# Patient Record
Sex: FEMALE | Race: WHITE
[De-identification: names, ages, dates, MRNs, and addresses within clinical notes are randomized per-mention and may not be internally consistent; named-entity substitution may affect disease eponyms.]

---

## 2019-08-28 ENCOUNTER — HOSPITAL ENCOUNTER (OUTPATIENT)
Dept: HOSPITAL 74 - JRADIR | Age: 84
Discharge: HOME | End: 2019-08-28
Attending: INTERNAL MEDICINE
Payer: COMMERCIAL

## 2019-08-28 DIAGNOSIS — D34: Primary | ICD-10-CM

## 2019-08-28 PROCEDURE — 0GBG3ZX EXCISION OF LEFT THYROID GLAND LOBE, PERCUTANEOUS APPROACH, DIAGNOSTIC: ICD-10-PCS | Performed by: RADIOLOGY

## 2019-08-28 PROCEDURE — BG44ZZZ ULTRASONOGRAPHY OF THYROID GLAND: ICD-10-PCS | Performed by: RADIOLOGY

## 2019-09-03 NOTE — PATH
Cytology Non-Gynecological Report



Patient Name:  TWIN HYATT

Accession #:  

[facility]. Rec. #:  [id number]                                                        

   /Age/Gender:  1935 (Age: 84) / F

Account:  [id number]                                                          

             Location: RADIOLOGY INTER

Taken:  2019

Received:  2019

Reported:  9/3/2019

Physicians:  MEGHANA Zapata M.D.

  



Specimen(s) Received

 LEFT THYROID FNA 





Clinical History

Left thyroid nodule







Final Diagnosis

THYROID, LEFT, FINE NEEDLE ASPIRATION:

SATISFACTORY FOR EVALUATION

BETHESDA CLASS II: BENIGN 

SMALL FOLLICULAR CELLS AND COLLOID PRESENT, CONSISTENT WITH A BENIGN FOLLICULAR

NODULE. 







***Electronically Signed***

Rubi Cabello M.D.





Gross Description

Received are eight direct smears, four of which are air-dried and Diff-Quik

stained, and four of which are alcohol fixed and Pap stained.  Also received is

20 ml of bloody formalin from which one cellblock is prepared.

## 2019-09-04 ENCOUNTER — HOSPITAL ENCOUNTER (OUTPATIENT)
Dept: HOSPITAL 74 - JRADIR | Age: 84
Discharge: HOME | End: 2019-09-04
Attending: INTERNAL MEDICINE
Payer: COMMERCIAL

## 2019-09-04 DIAGNOSIS — E04.1: Primary | ICD-10-CM

## 2019-09-04 PROCEDURE — 0G9K3ZX DRAINAGE OF THYROID GLAND, PERCUTANEOUS APPROACH, DIAGNOSTIC: ICD-10-PCS | Performed by: RADIOLOGY

## 2019-09-06 NOTE — PATH
Cytology Non-Gynecological Report



Patient Name:  TWIN HYATT

Accession #:  

[facility]. Rec. #:  [id number]                                                        

   /Age/Gender:  1935 (Age: 84) / F

Account:  [id number]                                                          

             Location: RADIOLOGY INTER

Taken:  2019

Received:  2019

Reported:  2019

Physicians:  MEGHANA Zapata M.D.

  



Specimen(s) Received

 RIGHT THYROID FNA 





Clinical History

Right thyroid nodule 







Final Diagnosis

THYROID, RIGHT, FINE NEEDLE ASPIRATION:

SATISFACTORY FOR EVALUATION

BETHESDA CLASS II: BENIGN 

SMALL FOLLICULAR CELLS, MACROPHAGES, AND COLLOID PRESENT, CONSISTENT WITH A

BENIGN NODULE.







***Electronically Signed***

Rubi Cabello M.D.





Gross Description

Received are eight direct smears, four of which are air-dried and Diff-Quik

stained, and four of which are alcohol fixed and Pap stained.  Also received is

20 ml of bloody formalin from which one cellblock is prepared.

## 2021-10-21 ENCOUNTER — HOSPITAL ENCOUNTER (INPATIENT)
Dept: HOSPITAL 74 - JER | Age: 86
LOS: 1 days | Discharge: HOME | DRG: 312 | End: 2021-10-22
Attending: FAMILY MEDICINE | Admitting: FAMILY MEDICINE
Payer: COMMERCIAL

## 2021-10-21 VITALS — BODY MASS INDEX: 28.5 KG/M2

## 2021-10-21 DIAGNOSIS — D72.829: ICD-10-CM

## 2021-10-21 DIAGNOSIS — E11.9: ICD-10-CM

## 2021-10-21 DIAGNOSIS — R55: Primary | ICD-10-CM

## 2021-10-21 DIAGNOSIS — R82.71: ICD-10-CM

## 2021-10-21 DIAGNOSIS — R00.1: ICD-10-CM

## 2021-10-21 DIAGNOSIS — E21.3: ICD-10-CM

## 2021-10-21 DIAGNOSIS — I45.10: ICD-10-CM

## 2021-10-21 DIAGNOSIS — I10: ICD-10-CM

## 2021-10-21 LAB
ALBUMIN SERPL-MCNC: 4 G/DL (ref 3.4–5)
ALP SERPL-CCNC: 117 U/L (ref 45–117)
ALT SERPL-CCNC: 25 U/L (ref 13–61)
ANION GAP SERPL CALC-SCNC: 11 MMOL/L (ref 8–16)
APPEARANCE UR: CLEAR
APTT BLD: 27.9 SECONDS (ref 25.2–36.5)
AST SERPL-CCNC: 22 U/L (ref 15–37)
BASOPHILS # BLD: 0.6 % (ref 0–2)
BILIRUB SERPL-MCNC: 0.6 MG/DL (ref 0.2–1)
BILIRUB UR STRIP.AUTO-MCNC: NEGATIVE MG/DL
BUN SERPL-MCNC: 31.1 MG/DL (ref 7–18)
CALCIUM SERPL-MCNC: 9.7 MG/DL (ref 8.5–10.1)
CHLORIDE SERPL-SCNC: 101 MMOL/L (ref 98–107)
CO2 SERPL-SCNC: 25 MMOL/L (ref 21–32)
COLOR UR: YELLOW
CREAT SERPL-MCNC: 0.9 MG/DL (ref 0.55–1.3)
DEPRECATED RDW RBC AUTO: 13.6 % (ref 11.6–15.6)
EOSINOPHIL # BLD: 1.4 % (ref 0–4.5)
GLUCOSE SERPL-MCNC: 114 MG/DL (ref 74–106)
HCT VFR BLD CALC: 44.6 % (ref 32.4–45.2)
HGB BLD-MCNC: 15.1 GM/DL (ref 10.7–15.3)
INR BLD: 0.99 (ref 0.83–1.09)
KETONES UR QL STRIP: NEGATIVE
LEUKOCYTE ESTERASE UR QL STRIP.AUTO: NEGATIVE
LYMPHOCYTES # BLD: 18.8 % (ref 8–40)
MAGNESIUM SERPL-MCNC: 1.8 MG/DL (ref 1.8–2.4)
MCH RBC QN AUTO: 29 PG (ref 25.7–33.7)
MCHC RBC AUTO-ENTMCNC: 33.8 G/DL (ref 32–36)
MCV RBC: 85.5 FL (ref 80–96)
MONOCYTES # BLD AUTO: 6.8 % (ref 3.8–10.2)
NEUTROPHILS # BLD: 72.4 % (ref 42.8–82.8)
NITRITE UR QL STRIP: NEGATIVE
PH UR: 7 [PH] (ref 5–8)
PHOSPHATE SERPL-MCNC: 4 MG/DL (ref 2.5–4.9)
PLATELET # BLD AUTO: 352 10^3/UL (ref 134–434)
PMV BLD: 7.8 FL (ref 7.5–11.1)
PROT SERPL-MCNC: 8.3 G/DL (ref 6.4–8.2)
PROT UR QL STRIP: NEGATIVE
PROT UR QL STRIP: NEGATIVE
PT PNL PPP: 11.1 SEC (ref 9.7–13)
RBC # BLD AUTO: 5.21 M/MM3 (ref 3.6–5.2)
SODIUM SERPL-SCNC: 137 MMOL/L (ref 136–145)
SP GR UR: 1 (ref 1.01–1.03)
UROBILINOGEN UR STRIP-MCNC: 0.2 MG/DL (ref 0.2–1)
WBC # BLD AUTO: 12.2 K/MM3 (ref 4–10)

## 2021-10-21 PROCEDURE — U0005 INFEC AGEN DETEC AMPLI PROBE: HCPCS

## 2021-10-21 PROCEDURE — U0003 INFECTIOUS AGENT DETECTION BY NUCLEIC ACID (DNA OR RNA); SEVERE ACUTE RESPIRATORY SYNDROME CORONAVIRUS 2 (SARS-COV-2) (CORONAVIRUS DISEASE [COVID-19]), AMPLIFIED PROBE TECHNIQUE, MAKING USE OF HIGH THROUGHPUT TECHNOLOGIES AS DESCRIBED BY CMS-2020-01-R: HCPCS

## 2021-10-21 PROCEDURE — C9803 HOPD COVID-19 SPEC COLLECT: HCPCS

## 2021-10-21 RX ADMIN — HEPARIN SODIUM SCH UNIT: 5000 INJECTION, SOLUTION INTRAVENOUS; SUBCUTANEOUS at 22:37

## 2021-10-22 VITALS — HEART RATE: 59 BPM | SYSTOLIC BLOOD PRESSURE: 137 MMHG | DIASTOLIC BLOOD PRESSURE: 82 MMHG

## 2021-10-22 VITALS — TEMPERATURE: 98 F

## 2021-10-22 LAB
ALBUMIN SERPL-MCNC: 3.2 G/DL (ref 3.4–5)
ALP SERPL-CCNC: 96 U/L (ref 45–117)
ALT SERPL-CCNC: 21 U/L (ref 13–61)
ANION GAP SERPL CALC-SCNC: 11 MMOL/L (ref 8–16)
AST SERPL-CCNC: 19 U/L (ref 15–37)
BILIRUB SERPL-MCNC: 0.6 MG/DL (ref 0.2–1)
BUN SERPL-MCNC: 28.9 MG/DL (ref 7–18)
CALCIUM SERPL-MCNC: 9.8 MG/DL (ref 8.5–10.1)
CHLORIDE SERPL-SCNC: 103 MMOL/L (ref 98–107)
CO2 SERPL-SCNC: 26 MMOL/L (ref 21–32)
CREAT SERPL-MCNC: 0.9 MG/DL (ref 0.55–1.3)
DEPRECATED RDW RBC AUTO: 14.3 % (ref 11.6–15.6)
GLUCOSE SERPL-MCNC: 126 MG/DL (ref 74–106)
HCT VFR BLD CALC: 39.7 % (ref 32.4–45.2)
HGB BLD-MCNC: 13.6 GM/DL (ref 10.7–15.3)
MCH RBC QN AUTO: 28.9 PG (ref 25.7–33.7)
MCHC RBC AUTO-ENTMCNC: 34.2 G/DL (ref 32–36)
MCV RBC: 84.7 FL (ref 80–96)
PLATELET # BLD AUTO: 321 10^3/UL (ref 134–434)
PMV BLD: 7.8 FL (ref 7.5–11.1)
PROT SERPL-MCNC: 6.8 G/DL (ref 6.4–8.2)
RBC # BLD AUTO: 4.69 M/MM3 (ref 3.6–5.2)
SODIUM SERPL-SCNC: 139 MMOL/L (ref 136–145)
WBC # BLD AUTO: 7.8 K/MM3 (ref 4–10)

## 2021-10-22 RX ADMIN — HEPARIN SODIUM SCH UNIT: 5000 INJECTION, SOLUTION INTRAVENOUS; SUBCUTANEOUS at 09:02
